# Patient Record
Sex: FEMALE | URBAN - NONMETROPOLITAN AREA
[De-identification: names, ages, dates, MRNs, and addresses within clinical notes are randomized per-mention and may not be internally consistent; named-entity substitution may affect disease eponyms.]

---

## 2024-06-12 ENCOUNTER — LAB OUTSIDE AN ENCOUNTER (OUTPATIENT)
Dept: URBAN - NONMETROPOLITAN AREA CLINIC 13 | Facility: CLINIC | Age: 54
End: 2024-06-12

## 2024-06-12 ENCOUNTER — OFFICE VISIT (OUTPATIENT)
Dept: URBAN - NONMETROPOLITAN AREA CLINIC 13 | Facility: CLINIC | Age: 54
End: 2024-06-12
Payer: COMMERCIAL

## 2024-06-12 ENCOUNTER — DASHBOARD ENCOUNTERS (OUTPATIENT)
Age: 54
End: 2024-06-12

## 2024-06-12 VITALS
BODY MASS INDEX: 19.46 KG/M2 | DIASTOLIC BLOOD PRESSURE: 66 MMHG | HEART RATE: 67 BPM | HEIGHT: 65 IN | WEIGHT: 116.8 LBS | SYSTOLIC BLOOD PRESSURE: 105 MMHG

## 2024-06-12 DIAGNOSIS — K52.831 COLLAGENOUS COLITIS: ICD-10-CM

## 2024-06-12 DIAGNOSIS — K52.9 CHRONIC DIARRHEA: ICD-10-CM

## 2024-06-12 DIAGNOSIS — Z12.11 COLON CANCER SCREENING: ICD-10-CM

## 2024-06-12 PROBLEM — 19311003: Status: ACTIVE | Noted: 2024-06-12

## 2024-06-12 PROBLEM — 236071009: Status: ACTIVE | Noted: 2024-06-12

## 2024-06-12 PROBLEM — 305058001: Status: ACTIVE | Noted: 2024-06-12

## 2024-06-12 PROCEDURE — 99204 OFFICE O/P NEW MOD 45 MIN: CPT | Performed by: NURSE PRACTITIONER

## 2024-06-12 RX ORDER — SODIUM, POTASSIUM,MAG SULFATES 17.5-3.13G
177 MILLILITER SOLUTION, RECONSTITUTED, ORAL ORAL
Qty: 1 UNSPECIFIED | Refills: 0 | OUTPATIENT
Start: 2024-06-12 | End: 2024-06-13

## 2024-06-12 NOTE — HPI-TODAY'S VISIT:
6/12/2024 Dannielle is a 54-year-old female referred to me for history of collagenous colitis and diarrhea.  A copy this note be sent to the referring physician.  She was diagnosed with collagenous colitis in 2011 and treated with colestipol with no relief.  She has 3-4 loose bowel movements during flares, otherwise she has 1 soft bowel movement in the morning.  She does report near incontinence at times.  She does not take anything regularly like Imodium.  Today she agrees to start fiber, Florastor, and Imodium as needed.  Repeat colonoscopy and plan for a course of budesonide if she has active disease.  Her daughter is our patient and has celiac disease, we will also check her celiac titers.  MB

## 2024-06-17 LAB
GAMMAGLOBULIN; IGA: 155
INTERP CELIAC DISEASE: (no result)
TTG AB IGA: <1

## 2024-06-28 ENCOUNTER — OFFICE VISIT (OUTPATIENT)
Dept: URBAN - NONMETROPOLITAN AREA SURGERY CENTER 1 | Facility: SURGERY CENTER | Age: 54
End: 2024-06-28

## 2024-07-10 ENCOUNTER — WEB ENCOUNTER (OUTPATIENT)
Dept: URBAN - NONMETROPOLITAN AREA CLINIC 13 | Facility: CLINIC | Age: 54
End: 2024-07-10

## 2024-07-10 RX ORDER — BUDESONIDE 3 MG/1
3 CAP PO DAILY CAPSULE, DELAYED RELEASE PELLETS ORAL ONCE A DAY
Qty: 90 CAPSULE | Refills: 1 | OUTPATIENT
Start: 2024-07-15

## 2024-07-15 ENCOUNTER — WEB ENCOUNTER (OUTPATIENT)
Dept: URBAN - NONMETROPOLITAN AREA CLINIC 13 | Facility: CLINIC | Age: 54
End: 2024-07-15

## 2024-07-23 ENCOUNTER — WEB ENCOUNTER (OUTPATIENT)
Dept: URBAN - NONMETROPOLITAN AREA CLINIC 13 | Facility: CLINIC | Age: 54
End: 2024-07-23

## 2024-08-07 ENCOUNTER — LAB OUTSIDE AN ENCOUNTER (OUTPATIENT)
Dept: URBAN - METROPOLITAN AREA TELEHEALTH 2 | Facility: TELEHEALTH | Age: 54
End: 2024-08-07

## 2024-08-07 ENCOUNTER — OFFICE VISIT (OUTPATIENT)
Dept: URBAN - METROPOLITAN AREA TELEHEALTH 2 | Facility: TELEHEALTH | Age: 54
End: 2024-08-07
Payer: COMMERCIAL

## 2024-08-07 ENCOUNTER — TELEPHONE ENCOUNTER (OUTPATIENT)
Dept: URBAN - NONMETROPOLITAN AREA CLINIC 2 | Facility: CLINIC | Age: 54
End: 2024-08-07

## 2024-08-07 DIAGNOSIS — Z12.11 COLON CANCER SCREENING: ICD-10-CM

## 2024-08-07 DIAGNOSIS — R10.13 EPIGASTRIC ABDOMINAL PAIN: ICD-10-CM

## 2024-08-07 DIAGNOSIS — K52.831 COLLAGENOUS COLITIS: ICD-10-CM

## 2024-08-07 PROBLEM — 79922009: Status: ACTIVE | Noted: 2024-08-07

## 2024-08-07 PROCEDURE — 99214 OFFICE O/P EST MOD 30 MIN: CPT | Performed by: NURSE PRACTITIONER

## 2024-08-07 RX ORDER — BUDESONIDE 3 MG/1
3 CAP PO DAILY CAPSULE, DELAYED RELEASE PELLETS ORAL ONCE A DAY
Qty: 90 CAPSULE | Refills: 1 | Status: ACTIVE | COMMUNITY
Start: 2024-07-15

## 2024-08-07 NOTE — HPI-TODAY'S VISIT:
6/12/2024 Dannielle is a 54-year-old female referred to me for history of collagenous colitis and diarrhea.  A copy this note be sent to the referring physician.  She was diagnosed with collagenous colitis in 2011 and treated with colestipol with no relief.  She has 3-4 loose bowel movements during flares, otherwise she has 1 soft bowel movement in the morning.  She does report near incontinence at times.  She does not take anything regularly like Imodium.  Today she agrees to start fiber, Florastor, and Imodium as needed.  Repeat colonoscopy and plan for a course of budesonide if she has active disease.  Her daughter is our patient and has celiac disease, we will also check her celiac titers.  MB 8/7/2024 Dannielle presents for colonoscopy follow-up.  Her colonoscopy revealed collagenous colitis.  She was doing better on fiber and Florastor.  She started budesonide and had multiple symptoms including abdominal cramping, she did not think it helped the diarrhea significantly.  She also was eating a meghan at the time of the acute diarrhea.  Recently she has had worsening dyspepsia, she is concerned this is related to the budesonide.  Today she will discontinue the budesonide, will start Pepcid 20 mg daily, if no relief would recommend labs and imaging and upper endoscopy for further evaluation.  MB

## 2024-08-08 ENCOUNTER — TELEPHONE ENCOUNTER (OUTPATIENT)
Dept: URBAN - NONMETROPOLITAN AREA CLINIC 2 | Facility: CLINIC | Age: 54
End: 2024-08-08

## 2024-11-18 ENCOUNTER — WEB ENCOUNTER (OUTPATIENT)
Dept: URBAN - NONMETROPOLITAN AREA CLINIC 13 | Facility: CLINIC | Age: 54
End: 2024-11-18